# Patient Record
Sex: MALE | Race: WHITE | URBAN - METROPOLITAN AREA
[De-identification: names, ages, dates, MRNs, and addresses within clinical notes are randomized per-mention and may not be internally consistent; named-entity substitution may affect disease eponyms.]

---

## 2017-10-05 RX ORDER — ASCORBIC ACID 500 MG
500 TABLET ORAL DAILY
COMMUNITY

## 2017-10-05 RX ORDER — FERROUS SULFATE 324(65)MG
TABLET, DELAYED RELEASE (ENTERIC COATED) ORAL
COMMUNITY

## 2017-10-05 RX ORDER — LISINOPRIL AND HYDROCHLOROTHIAZIDE 20; 25 MG/1; MG/1
1 TABLET ORAL DAILY
COMMUNITY

## 2017-10-09 ENCOUNTER — HOSPITAL ENCOUNTER (OUTPATIENT)
Facility: CLINIC | Age: 67
Discharge: HOME | End: 2017-10-09
Attending: OPHTHALMOLOGY | Admitting: OPHTHALMOLOGY
Payer: MEDICARE

## 2017-10-09 ENCOUNTER — ANESTHESIA EVENT (OUTPATIENT)
Dept: SURGERY | Facility: CLINIC | Age: 67
End: 2017-10-09
Payer: MEDICARE

## 2017-10-09 ENCOUNTER — ANESTHESIA (OUTPATIENT)
Dept: SURGERY | Facility: CLINIC | Age: 67
End: 2017-10-09
Payer: MEDICARE

## 2017-10-09 VITALS
RESPIRATION RATE: 14 BRPM | TEMPERATURE: 96.9 F | OXYGEN SATURATION: 98 % | DIASTOLIC BLOOD PRESSURE: 69 MMHG | BODY MASS INDEX: 50.62 KG/M2 | HEIGHT: 66 IN | WEIGHT: 315 LBS | SYSTOLIC BLOOD PRESSURE: 134 MMHG

## 2017-10-09 LAB — GLUCOSE BLDC GLUCOMTR-MCNC: 171 MG/DL (ref 70–99)

## 2017-10-09 PROCEDURE — 71000028 ZZH EYE RECOVERY PHASE 2 EACH 15 MINS: Performed by: OPHTHALMOLOGY

## 2017-10-09 PROCEDURE — 27210794 ZZH OR GENERAL SUPPLY STERILE: Performed by: OPHTHALMOLOGY

## 2017-10-09 PROCEDURE — V2632 POST CHMBR INTRAOCULAR LENS: HCPCS | Performed by: OPHTHALMOLOGY

## 2017-10-09 PROCEDURE — 82962 GLUCOSE BLOOD TEST: CPT

## 2017-10-09 PROCEDURE — 37000008 ZZH ANESTHESIA TECHNICAL FEE, 1ST 30 MIN: Performed by: OPHTHALMOLOGY

## 2017-10-09 PROCEDURE — 36000101 ZZH EYE SURGERY LEVEL 3 1ST 30 MIN: Performed by: OPHTHALMOLOGY

## 2017-10-09 PROCEDURE — 25000128 H RX IP 250 OP 636: Performed by: NURSE ANESTHETIST, CERTIFIED REGISTERED

## 2017-10-09 PROCEDURE — 25000128 H RX IP 250 OP 636: Performed by: OPHTHALMOLOGY

## 2017-10-09 PROCEDURE — 25000128 H RX IP 250 OP 636: Performed by: ANESTHESIOLOGY

## 2017-10-09 PROCEDURE — 25000125 ZZHC RX 250: Performed by: ANESTHESIOLOGY

## 2017-10-09 PROCEDURE — 25000125 ZZHC RX 250: Performed by: OPHTHALMOLOGY

## 2017-10-09 PROCEDURE — 40000170 ZZH STATISTIC PRE-PROCEDURE ASSESSMENT II: Performed by: OPHTHALMOLOGY

## 2017-10-09 DEVICE — EYE IMP IOL AMO PCL TECNIS ZCB00 23.0: Type: IMPLANTABLE DEVICE | Site: EYE | Status: FUNCTIONAL

## 2017-10-09 RX ORDER — TIMOLOL MALEATE 5 MG/ML
SOLUTION/ DROPS OPHTHALMIC PRN
Status: DISCONTINUED | OUTPATIENT
Start: 2017-10-09 | End: 2017-10-09 | Stop reason: HOSPADM

## 2017-10-09 RX ORDER — PHENYLEPHRINE HYDROCHLORIDE 25 MG/ML
1 SOLUTION/ DROPS OPHTHALMIC
Status: COMPLETED | OUTPATIENT
Start: 2017-10-09 | End: 2017-10-09

## 2017-10-09 RX ORDER — TROPICAMIDE 10 MG/ML
1 SOLUTION/ DROPS OPHTHALMIC
Status: COMPLETED | OUTPATIENT
Start: 2017-10-09 | End: 2017-10-09

## 2017-10-09 RX ORDER — PROPARACAINE HYDROCHLORIDE 5 MG/ML
1 SOLUTION/ DROPS OPHTHALMIC ONCE
Status: DISCONTINUED | OUTPATIENT
Start: 2017-10-09 | End: 2017-10-09 | Stop reason: HOSPADM

## 2017-10-09 RX ORDER — DICLOFENAC SODIUM 1 MG/ML
1 SOLUTION/ DROPS OPHTHALMIC
Status: COMPLETED | OUTPATIENT
Start: 2017-10-09 | End: 2017-10-09

## 2017-10-09 RX ORDER — BALANCED SALT SOLUTION 6.4; .75; .48; .3; 3.9; 1.7 MG/ML; MG/ML; MG/ML; MG/ML; MG/ML; MG/ML
SOLUTION OPHTHALMIC PRN
Status: DISCONTINUED | OUTPATIENT
Start: 2017-10-09 | End: 2017-10-09 | Stop reason: HOSPADM

## 2017-10-09 RX ORDER — LIDOCAINE HYDROCHLORIDE 10 MG/ML
INJECTION, SOLUTION EPIDURAL; INFILTRATION; INTRACAUDAL; PERINEURAL PRN
Status: DISCONTINUED | OUTPATIENT
Start: 2017-10-09 | End: 2017-10-09 | Stop reason: HOSPADM

## 2017-10-09 RX ORDER — SODIUM CHLORIDE, SODIUM LACTATE, POTASSIUM CHLORIDE, CALCIUM CHLORIDE 600; 310; 30; 20 MG/100ML; MG/100ML; MG/100ML; MG/100ML
500 INJECTION, SOLUTION INTRAVENOUS CONTINUOUS
Status: DISCONTINUED | OUTPATIENT
Start: 2017-10-09 | End: 2017-10-09 | Stop reason: HOSPADM

## 2017-10-09 RX ORDER — PROPARACAINE HYDROCHLORIDE 5 MG/ML
1 SOLUTION/ DROPS OPHTHALMIC ONCE
Status: COMPLETED | OUTPATIENT
Start: 2017-10-09 | End: 2017-10-09

## 2017-10-09 RX ORDER — MOXIFLOXACIN 5 MG/ML
1 SOLUTION/ DROPS OPHTHALMIC
Status: COMPLETED | OUTPATIENT
Start: 2017-10-09 | End: 2017-10-09

## 2017-10-09 RX ADMIN — PHENYLEPHRINE HYDROCHLORIDE 1 DROP: 2.5 SOLUTION/ DROPS OPHTHALMIC at 10:09

## 2017-10-09 RX ADMIN — PROPARACAINE HYDROCHLORIDE 1 DROP: 5 SOLUTION/ DROPS OPHTHALMIC at 10:04

## 2017-10-09 RX ADMIN — MOXIFLOXACIN 1 DROP: 5 SOLUTION/ DROPS OPHTHALMIC at 10:09

## 2017-10-09 RX ADMIN — DICLOFENAC SODIUM 1 DROP: 1 SOLUTION/ DROPS OPHTHALMIC at 10:09

## 2017-10-09 RX ADMIN — TROPICAMIDE 1 DROP: 10 SOLUTION/ DROPS OPHTHALMIC at 10:09

## 2017-10-09 RX ADMIN — DICLOFENAC SODIUM 1 DROP: 1 SOLUTION/ DROPS OPHTHALMIC at 10:15

## 2017-10-09 RX ADMIN — MOXIFLOXACIN 1 DROP: 5 SOLUTION/ DROPS OPHTHALMIC at 10:04

## 2017-10-09 RX ADMIN — SODIUM CHLORIDE, SODIUM LACTATE, POTASSIUM CHLORIDE, CALCIUM CHLORIDE: 600; 310; 30; 20 INJECTION, SOLUTION INTRAVENOUS at 11:32

## 2017-10-09 RX ADMIN — DICLOFENAC SODIUM 1 DROP: 1 SOLUTION/ DROPS OPHTHALMIC at 10:04

## 2017-10-09 RX ADMIN — LIDOCAINE HYDROCHLORIDE 1 ML: 10 INJECTION, SOLUTION EPIDURAL; INFILTRATION; INTRACAUDAL; PERINEURAL at 10:24

## 2017-10-09 RX ADMIN — SODIUM CHLORIDE, SODIUM LACTATE, POTASSIUM CHLORIDE, CALCIUM CHLORIDE 500 ML: 600; 310; 30; 20 INJECTION, SOLUTION INTRAVENOUS at 10:24

## 2017-10-09 RX ADMIN — MIDAZOLAM HYDROCHLORIDE 2 MG: 1 INJECTION, SOLUTION INTRAMUSCULAR; INTRAVENOUS at 11:35

## 2017-10-09 RX ADMIN — PHENYLEPHRINE HYDROCHLORIDE 1 DROP: 2.5 SOLUTION/ DROPS OPHTHALMIC at 10:15

## 2017-10-09 RX ADMIN — MOXIFLOXACIN 1 DROP: 5 SOLUTION/ DROPS OPHTHALMIC at 10:15

## 2017-10-09 RX ADMIN — TROPICAMIDE 1 DROP: 10 SOLUTION/ DROPS OPHTHALMIC at 10:15

## 2017-10-09 RX ADMIN — PHENYLEPHRINE HYDROCHLORIDE 1 DROP: 2.5 SOLUTION/ DROPS OPHTHALMIC at 10:03

## 2017-10-09 RX ADMIN — TROPICAMIDE 1 DROP: 10 SOLUTION/ DROPS OPHTHALMIC at 10:04

## 2017-10-09 NOTE — IP AVS SNAPSHOT
[facility]    [address]    [place] [address]    Phone:  [phone]    Fax:  [phone]                                       After Visit Summary   10/9/2017    Beck Sharpe    MRN: [id number]           After Visit Summary Signature Page     I have received my discharge instructions, and my questions have been answered. I have discussed any challenges I see with this plan with the nurse or doctor.    ..........................................................................................................................................  Patient/Patient Representative Signature      ..........................................................................................................................................  Patient Representative Print Name and Relationship to Patient    ..................................................               ................................................  Date                                            Time    ..........................................................................................................................................  Reviewed by Signature/Title    ...................................................              ..............................................  Date                                                            Time

## 2017-10-09 NOTE — ANESTHESIA PREPROCEDURE EVALUATION
Procedure: Procedure(s):  PHACOEMULSIFICATION CLEAR CORNEA WITH STANDARD INTRAOCULAR LENS IMPLANT  Preop diagnosis: CATARACT    Allergies   Allergen Reactions     Adhesive Tape      Past Medical History:   Diagnosis Date     Coronary artery disease      Diabetic leg ulcer (H)      Gout      Hyperlipidemia      Hypertension      Microalbuminuria      Morbid obesity (H)      Osteoarthritis      Sleep apnea     cpap     Subclavian artery stenosis (H)      Vitamin D deficiency      Past Surgical History:   Procedure Laterality Date     CARDIAC SURGERY       COLONOSCOPY       ENT SURGERY      tonsilectomy     GI SURGERY      gastric bypass     ORTHOPEDIC SURGERY      knee arthroscopy     Social History   Substance Use Topics     Smoking status: Former Smoker     Smokeless tobacco: Former User     Alcohol use Yes      Comment: occassional     Prior to Admission medications    Medication Sig Start Date End Date Taking? Authorizing Provider   ALLOPURINOL PO Take 300 mg by mouth daily   Yes Reported, Patient   ascorbic acid 500 MG TABS Take 500 mg by mouth daily   Yes Reported, Patient   aspirin 81 MG EC tablet Take 81 mg by mouth daily   Yes Reported, Patient   ATENOLOL PO Take 50 mg by mouth daily   Yes Reported, Patient   calcium Citrate-vitamin D 500-400 MG-UNIT CHEW    Yes Reported, Patient   Cyanocobalamin (VITAMIN B 12 PO) Take 1,000 mcg by mouth daily   Yes Reported, Patient   Ferrous Sulfate 324 (65 FE) MG TBEC    Yes Reported, Patient   lisinopril-hydrochlorothiazide (PRINZIDE/ZESTORETIC) 20-25 MG per tablet Take 1 tablet by mouth daily   Yes Reported, Patient   Multiple Vitamin (MULTI VITAMIN DAILY PO) Take 1 tablet by mouth   Yes Reported, Patient   TRAMADOL HCL PO Take 50 mg by mouth 2 times daily   Yes Reported, Patient     Current Facility-Administered Medications Ordered in Epic   Medication Dose Route Frequency Last Rate Last Dose     proparacaine (ALCAINE) 0.5 % ophthalmic solution 1 drop  1 drop Ophthalmic  Once         lidocaine (AKTEN) ophthalmic gel 0.5 mL  0.5 mL Ophthalmic Once         povidone-iodine 5 % ophthalmic solution 1 drop  1 drop Ophthalmic Once         lidocaine 1 % 1 mL  1 mL Other Q1H PRN   1 mL at 10/09/17 1024     lactated ringers infusion  500 mL Intravenous Continuous 25 mL/hr at 10/09/17 1024 500 mL at 10/09/17 1024     No current [facility]-ordered outpatient prescriptions on file.       lactated ringers 500 mL (10/09/17 1024)     Wt Readings from Last 1 Encounters:   10/09/17 (!) 155.1 kg (342 lb)     Temp Readings from Last 1 Encounters:   10/09/17 36.1  C (96.9  F) (Temporal)     BP Readings from Last 6 Encounters:   10/09/17 129/61     Pulse Readings from Last 4 Encounters:   No data found for Pulse     Resp Readings from Last 1 Encounters:   10/09/17 16     SpO2 Readings from Last 1 Encounters:   10/09/17 96%     No results for input(s): NA, POTASSIUM, CHLORIDE, CO2, ANIONGAP, GLC, BUN, CR, ZELDA in the last 93980 hours.  No results for input(s): AST, ALT, ALKPHOS, BILITOTAL, LIPASE in the last 74369 hours.  No results for input(s): WBC, HGB, PLT in the last 58491 hours.  No results for input(s): ABO, RH in the last 01191 hours.  No results for input(s): INR, PTT in the last 80664 hours.   No results for input(s): TROPI in the last 24096 hours.  No results for input(s): PH, PCO2, PO2, HCO3 in the last 24700 hours.  No results for input(s): HCG in the last 47283 hours.  No results found for this or any previous visit (from the past 744 hour(s)).    RECENT LABS:   ECG:   ECHO:   Anesthesia Evaluation     . Pt has had prior anesthetic.     No history of anesthetic complications          ROS/MED HX    ENT/Pulmonary:     (+)sleep apnea, , . .    Neurologic:       Cardiovascular:     (+) hypertension--CAD, --. : . . . :. .       METS/Exercise Tolerance:     Hematologic:         Musculoskeletal:         GI/Hepatic:         Renal/Genitourinary:         Endo:     (+) Obesity, .      Psychiatric:          Infectious Disease:         Malignancy:         Other:                     Physical Exam  Normal systems: cardiovascular and pulmonary    Airway   Mallampati: I  Neck ROM: full    Dental   (+) caps    Cardiovascular       Pulmonary                     Anesthesia Plan      History & Physical Review  History and physical reviewed and following examination; no interval change.    ASA Status:  3 .    NPO Status:  > 8 hours    Plan for MAC Reason for MAC:  Procedure to face, neck, head or breast         Postoperative Care  Postoperative pain management:  IV analgesics.      Consents  Anesthetic plan, risks, benefits and alternatives discussed with:  Patient..                          .

## 2017-10-09 NOTE — ANESTHESIA CARE TRANSFER NOTE
Patient: Beck Sharpe    Procedure(s):  RIGHT EYE PHACOEMULSIFICATION CLEAR CORNEA WITH STANDARD INTRAOCULAR LENS IMPLANT - Wound Class: I-Clean    Diagnosis: CATARACT  Diagnosis Additional Information: No value filed.    Anesthesia Type:   MAC     Note:  Airway :Room Air  Patient transferred to:PACU        Vitals: (Last set prior to Anesthesia Care Transfer)    CRNA VITALS  10/9/2017 1122 - 10/9/2017 1157      10/9/2017             Pulse: (!)  49    Ht Rate: 54    SpO2: 100 %    Resp Rate (set): 10                Electronically Signed By: MARY ALICE Rodriguez CRNA  October 9, 2017  11:57 AM

## 2017-10-09 NOTE — DISCHARGE INSTRUCTIONS
Discharge Instructions Following Cataract Surgery  Dr. Ayala    Date: October 9, 2017    EYE MEDICATIONS:  You should start drops immediately when you arrive home.        You may have been prescribed SmartDrops, which is a compound formla drop that combines all three medications in a single drop.  SmartDrops should be instilled into the surgical eye 3 times daily until gone.    Put only ordered eye drops into the operative eye.  If you have glaucoma, continue your usual drops, unless directed otherwise.    For 3 days: Wear your glasses or leave the eye uncovered while awake.    Wear the eye shield every night and during daytime naps.  Use no padding under eye shield.    You may notice blurred or double vision initially, this will gradually clear.     Report any severe eye pain.    Minor itching, scratching, burning etc. is normal.  Use regular or extra-strength Tylenol for discomfort.    Refrain from heavy lifting, excessive bending over, and heavy exertion for 1 week.    You may bathe or shower but do not get the eye wet.    Do not rub or push on the operative eye for 1 week.  You may wipe the lids gently with a warm wet cloth to remove matter from eyelashes.    Continue with your usual diet and medications prescribed by your doctor.    Sunglasses will increase your comfort post-operatively.    Post Operative Visit on 10/10 10:45 [place]  Bring all material and medications to the office on the first post-op day.  Call your surgeon at [phone] or the answering service at [phone] for any problems, especially pain.        [facility] Discharge  Instructions  Anesthesia (Eye Care Center)   Adult Discharge Instructions    For 24 hours after surgery    1. Get plenty of rest.  Make arrangements to have a responsible adult stay with you for at least 6 hours after you leave the hospital.  2. Do not drive or use heavy equipment for 24 hours.    3. Do not drink  alcohol for 24 hours.  4. Do not sign legal documents or make important decisions for 24 hours.  5. Avoid strenuous or risky activities. You may feel lightheaded.  If so, sit for a few minutes before standing.  Have someone help you get up.   6. Conscious sedation patients may resume a regular diet..  7. Any questions of medical nature, call your physician.

## 2017-10-09 NOTE — OP NOTE
PREOPERATIVE DIAGNOSIS: Cataract, Right eye.   POSTOPERATIVE DIAGNOSIS: Cataract, Right eye.   OPERATION: Phacoemulsification with placement of a posterior chamber intraocular lens implant, Right eye.   ANESTHESIA: Topical with intravenous sedation.   HISTORY: The patient, Beck Sharpe is a 67 year old male with a visually significant nuclear cataract of the Right eye. Best-corrected visual acuity preoperatively was 2020/30, causing significant visual difficulties with activities of daily living.   OPERATIVE REPORT: After informed consent was obtained, the patient was given a drop of topical Betadine along with 2% Xylocaine jelly in the operative eye. The patient was brought to the operating room, placed in the supine position, and prepped and draped in the usual sterile fashion. A lid speculum was placed over the operative eye. Paracentesis incisions were made at the limbus at 6 o'clock and 12 o'clock. Non-preserved lidocaine 1% was injected into the anterior chamber followed by Viscoat. A single plane clear corneal incision was made at the temporal limbus using a 2.5 mm steel keratome. A continuous tear capsulorrhexis was performed followed by hydrodissection of the lens nucleus with balanced salt solution. Phacoemulsification was carried out. The cortex was aspirated in bimanual fashion. The posterior capsule remained intact. The bag was inflated with Viscoat. A lens implant, ZCB00 23.0 diopter lens was inserted into the capsular bag without difficulty. It was noted to be well centered in the bag. The viscoelastic was aspirated. The primary incision and both paracentesis incisions were hydrated with balanced salt solution. The incisions were checked for leaks with fluorescein and found to be watertight. A drop of Timoptic and Vigamox were placed on the eye. The patient tolerated the procedure well. There were no intraoperative complications. The patient will be seen in follow-up by Dr. Ayala tomorrow.    TRAVIS RAY MD   10/9/2017

## 2017-10-09 NOTE — ANESTHESIA POSTPROCEDURE EVALUATION
Patient: Beck Sharpe    Procedure(s):  RIGHT EYE PHACOEMULSIFICATION CLEAR CORNEA WITH STANDARD INTRAOCULAR LENS IMPLANT - Wound Class: I-Clean    Diagnosis:CATARACT  Diagnosis Additional Information: No value filed.    Anesthesia Type:  MAC    Note:  Anesthesia Post Evaluation    Patient location during evaluation: PACU  Patient participation: Able to fully participate in evaluation  Level of consciousness: awake  Pain management: adequate  Airway patency: patent  Cardiovascular status: acceptable  Respiratory status: acceptable  Hydration status: acceptable  PONV: none     Anesthetic complications: None          Last vitals:  Vitals:    10/09/17 1010 10/09/17 1159 10/09/17 1211   BP: 129/61 146/80 134/69   Resp: 16 14 14   Temp: 36.1  C (96.9  F)     SpO2: 96% 100% 98%         Electronically Signed By: Gino Hoskins MD  October 9, 2017  1:58 PM

## 2021-05-25 ENCOUNTER — RECORDS - HEALTHEAST (OUTPATIENT)
Dept: ADMINISTRATIVE | Facility: CLINIC | Age: 71
End: 2021-05-25

## (undated) DEVICE — PACK CATARACT CUSTOM SO DALE SEY32CTFCX

## (undated) DEVICE — GLOVE PROTEXIS MICRO 7.5  2D73PM75

## (undated) DEVICE — GLOVE PROTEXIS MICRO 7.0  2D73PM70

## (undated) DEVICE — LINEN TOWEL PACK X5 5464

## (undated) DEVICE — EYE SHIELD PLASTIC

## (undated) DEVICE — EYE KNIFE SLIT XSTAR VISITEC 2.4MM 45DEG BEVEL UP 373724

## (undated) DEVICE — EYE PACK BVI READYPAK KIT #3

## (undated) DEVICE — EYE SOL BSS 500ML

## (undated) DEVICE — GLOVE PROTEXIS MICRO 6.5  2D73PM65

## (undated) DEVICE — EYE PACK CUSTOM ANTERIOR 30DEG TIP CENTURION PPK6682-04

## (undated) RX ORDER — LIDOCAINE HYDROCHLORIDE 10 MG/ML
INJECTION, SOLUTION EPIDURAL; INFILTRATION; INTRACAUDAL; PERINEURAL
Status: DISPENSED
Start: 2017-10-09

## (undated) RX ORDER — TIMOLOL 5 MG/ML
SOLUTION/ DROPS OPHTHALMIC
Status: DISPENSED
Start: 2017-10-09